# Patient Record
Sex: MALE | Race: WHITE | NOT HISPANIC OR LATINO
[De-identification: names, ages, dates, MRNs, and addresses within clinical notes are randomized per-mention and may not be internally consistent; named-entity substitution may affect disease eponyms.]

---

## 2019-12-29 ENCOUNTER — TRANSCRIPTION ENCOUNTER (OUTPATIENT)
Age: 69
End: 2019-12-29

## 2020-07-29 ENCOUNTER — TRANSCRIPTION ENCOUNTER (OUTPATIENT)
Age: 70
End: 2020-07-29

## 2021-03-10 ENCOUNTER — TRANSCRIPTION ENCOUNTER (OUTPATIENT)
Age: 71
End: 2021-03-10

## 2021-03-17 ENCOUNTER — TRANSCRIPTION ENCOUNTER (OUTPATIENT)
Age: 71
End: 2021-03-17

## 2021-03-19 ENCOUNTER — TRANSCRIPTION ENCOUNTER (OUTPATIENT)
Age: 71
End: 2021-03-19

## 2023-07-17 ENCOUNTER — FORM ENCOUNTER (OUTPATIENT)
Age: 73
End: 2023-07-17

## 2023-07-18 ENCOUNTER — APPOINTMENT (OUTPATIENT)
Dept: ORTHOPEDIC SURGERY | Facility: CLINIC | Age: 73
End: 2023-07-18
Payer: MEDICARE

## 2023-07-18 ENCOUNTER — APPOINTMENT (OUTPATIENT)
Dept: MRI IMAGING | Facility: CLINIC | Age: 73
End: 2023-07-18
Payer: MEDICARE

## 2023-07-18 VITALS — BODY MASS INDEX: 40.92 KG/M2 | WEIGHT: 270 LBS | HEIGHT: 68 IN

## 2023-07-18 DIAGNOSIS — I10 ESSENTIAL (PRIMARY) HYPERTENSION: ICD-10-CM

## 2023-07-18 DIAGNOSIS — Z00.00 ENCOUNTER FOR GENERAL ADULT MEDICAL EXAMINATION W/OUT ABNORMAL FINDINGS: ICD-10-CM

## 2023-07-18 DIAGNOSIS — S46.212A STRAIN OF MUSCLE, FASCIA AND TENDON OF OTHER PARTS OF BICEPS, LEFT ARM, INITIAL ENCOUNTER: ICD-10-CM

## 2023-07-18 PROCEDURE — 73080 X-RAY EXAM OF ELBOW: CPT | Mod: LT

## 2023-07-18 PROCEDURE — 73221 MRI JOINT UPR EXTREM W/O DYE: CPT | Mod: LT

## 2023-07-18 PROCEDURE — 99213 OFFICE O/P EST LOW 20 MIN: CPT

## 2023-07-18 RX ORDER — ATORVASTATIN CALCIUM 10 MG/1
10 TABLET, FILM COATED ORAL
Refills: 0 | Status: ACTIVE | COMMUNITY

## 2023-07-18 NOTE — HISTORY OF PRESENT ILLNESS
[Sudden] : sudden [7] : 7 [0] : 0 [Dull/Aching] : dull/aching [Sharp] : sharp [Household chores] : household chores [Leisure] : leisure [Meds] : meds [Ice] : ice [de-identified] : 7/18/23: 71 yo RHD male with left elbow and forearm pain since 7/17/23. He states he felt pain and a pop when lifting a matress. He iced and took tylenol. No numbness/tingling. No prior injuries. [] : Post Surgical Visit: no [FreeTextEntry1] : left elbow [FreeTextEntry3] : 7/17/23 [FreeTextEntry5] : pt was pulling on a mattress and felt a pop in the elbow on 7/17/23. [FreeTextEntry7] : left forearm, left wrist [de-identified] : motion/pressure

## 2023-07-18 NOTE — PHYSICAL EXAM
[Left] : left elbow [NL (0)] : extension 0 degrees [Pain with Extension] : pain with extension [5___] : extension 5[unfilled]/5 [] : light touch intact [de-identified] : Equivocal findings to palpable distal biceps tendon  [TWNoteComboBox7] : flexion 130 degrees

## 2023-07-18 NOTE — ASSESSMENT
[FreeTextEntry1] : Underlying pathology reviewed and treatment options discussed. \par 07/18/2023 : xrays left elbow, 3 views,  reveal negative\par Obtain  stat MRI left elbow  R/O distal biceps rupture. \par Start PT and HEP to improve mechanics and reduce pain. \par Activity modifier as tolerated. \par OTC aleve as tolerated.\par Apply ice to affected area.\par Questions addressed.\par \par The documentation recorded by the scribe accurately reflects the service I personally performed and the decisions made by me.\par I, Jose Alfredo Palmeribvilma, attest that this documentation has been prepared under the direction and in the presence of Provider Rodrigo Dangelo MD.\par \par The patient was seen by Rodrigo Dangelo MD.\par The following radiographs were ordered and read by me during this patient's visit. I reviewed each radiograph in detail with the patient, and discussed the findings as highlighted below.\par

## 2023-07-20 ENCOUNTER — APPOINTMENT (OUTPATIENT)
Dept: ORTHOPEDIC SURGERY | Facility: CLINIC | Age: 73
End: 2023-07-20
Payer: MEDICARE

## 2023-07-20 ENCOUNTER — APPOINTMENT (OUTPATIENT)
Dept: ORTHOPEDIC SURGERY | Facility: CLINIC | Age: 73
End: 2023-07-20

## 2023-07-20 VITALS — HEIGHT: 68 IN | BODY MASS INDEX: 40.92 KG/M2 | WEIGHT: 270 LBS

## 2023-07-20 DIAGNOSIS — E78.00 PURE HYPERCHOLESTEROLEMIA, UNSPECIFIED: ICD-10-CM

## 2023-07-20 PROCEDURE — 99214 OFFICE O/P EST MOD 30 MIN: CPT

## 2023-07-20 NOTE — HISTORY OF PRESENT ILLNESS
[de-identified] : 07/20/2023 Mr. NIKKI VALENTINE, a 72 year old male, presents today for left elbow. Reports that on 07.17.23 he was trying to lift a matress and he felt a "pop" and sudden onset of pain over the left elbow. Saw Dr. Dangelo on 07.18.23 and was sent for an MRI which shows complete rupture of the distal biceps tendon. \par Pmhx [FreeTextEntry1] : LT Elbow [FreeTextEntry5] : Jie is a 72 year M, here for an evaluation of the LT elbow. Pt was referred from [facility]. 07/17/2023 was lifting a mattress and heard a "pop" noise. Here for MRI Review.

## 2023-07-20 NOTE — PHYSICAL EXAM
[Left] : left elbow [NL (150)] : flexion 150 degrees [NL (0)] : extension 0 degrees [Pain with Pronation] : pain with pronation [Pain with Supination] : pain with supination [] : light touch intact

## 2023-07-20 NOTE — DATA REVIEWED
[MRI] : MRI [Left] : left [Elbow] : elbow [Report was reviewed and noted in the chart] : The report was reviewed and noted in the chart [I independently reviewed and interpreted images and report] : I independently reviewed and interpreted images and report [I reviewed the films/CD] : I reviewed the films/CD [FreeTextEntry1] : 07.18.23\par 1. Acute appearing complete disruption of the biceps tendon insertion with 2 cm of retraction and severe edema and swelling surrounding the retracted tendon with muscle strains and edema throughout the volar aspect of the elbow consistent with acute traumatic injury.\par 2. Moderate grade lateral epicondylitis, mild grade medial epicondylitis and mild triceps tendinopathy.\par 3. No acute fracture, malalignment or loose body.\par 4. Patient motion degrades image quality on multiple imaging sequences. Cimetidine Counseling:  I discussed with the patient the risks of Cimetidine including but not limited to gynecomastia, headache, diarrhea, nausea, drowsiness, arrhythmias, pancreatitis, skin rashes, psychosis, bone marrow suppression and kidney toxicity.

## 2023-07-20 NOTE — DISCUSSION/SUMMARY
[de-identified] : 72m with rupture left distal biceps tendon. r/b/a of surgical intervention and conservative management discussed. pt given to opportunity to ask all questions and all questions were answered.   Pt would like to proceed with surgery as discussed.\par \par Will plan for left distal biceps repair\par \par The patient was advised of the diagnosis. The natural history of the pathology was explained in full to the patient in layman's terms. All questions were answered. The risks and benefits of surgical and non-surgical treatment alternatives were explained in full to the patient. \par The patient demonstrated a full understanding of the surgical and non-surgical options. The risks of surgery were outlined in full to the patient including but not limited to bleeding, scarring, infection, sepsis, neurologic injury, vascular injury, failure to resolve symptoms, symptom recurrence, the need for further surgery, non-healing, wound breakdown, deep vein thrombosis, pulmonary embolism, spontaneous osteonecrosis, anesthesia complications and even death. The patient understood all the risks and accepted them and understood that other complications could occur that are not mentioned above. The intraoperative plan, post-operative plan, post-operative expectations and limitations were explained in full. Expectations from non-surgical treatment were explained in full as well. The patient demonstrated a complete understanding of the treatment alternatives and requested the above-mentioned procedure. This will be scheduled accordingly\par  \par The advantages, disadvantages, complications and alternatives of continued non-operative treatment versus operative treatment were discussed with the patient. We specifically discussed the risks of bleeding, infection, damage to neurovascular structures, failure of wound healing, wound dehiscence, scaring, failure of repair, need for revision surgery, elbow pain, elbow stiffness, elbow arthritis and complications of surgery and anesthesia in general including deep venous thrombosis, pulmonary embolism, myocardial infarction, stroke, loss of limb and death. The patient understood and agreed to proceed.\par  \par Entered by Ilsa Thrasher acting as scribe.\par Dr. Lambert-\par The documentation recorded by the scribe accurately reflects the service I personally performed and the decisions made by me.

## 2023-07-28 ENCOUNTER — APPOINTMENT (OUTPATIENT)
Dept: ORTHOPEDIC SURGERY | Facility: AMBULATORY SURGERY CENTER | Age: 73
End: 2023-07-28
Payer: MEDICARE

## 2023-07-28 PROCEDURE — 24342 REPAIR OF RUPTURED TENDON: CPT | Mod: AS,LT

## 2023-07-28 PROCEDURE — 24342 REPAIR OF RUPTURED TENDON: CPT | Mod: LT

## 2023-07-28 RX ORDER — OXYCODONE 5 MG/1
5 TABLET ORAL
Qty: 42 | Refills: 0 | Status: ACTIVE | COMMUNITY
Start: 2023-07-28 | End: 1900-01-01

## 2023-08-08 ENCOUNTER — APPOINTMENT (OUTPATIENT)
Dept: ORTHOPEDIC SURGERY | Facility: CLINIC | Age: 73
End: 2023-08-08
Payer: MEDICARE

## 2023-08-08 VITALS — WEIGHT: 270 LBS | HEIGHT: 68 IN | BODY MASS INDEX: 40.92 KG/M2

## 2023-08-08 PROCEDURE — 99024 POSTOP FOLLOW-UP VISIT: CPT

## 2023-08-08 NOTE — HISTORY OF PRESENT ILLNESS
[de-identified] : dos: 07.28.23 08/08/2023: Pt here for pov1 s/p left distal biceps repair. Doing well. Denies f/c/s.  07/20/2023 Mr. NIKKI VALENTINE, a 72 year old male, presents today for left elbow. Reports that on 07.17.23 he was trying to lift a matress and he felt a "pop" and sudden onset of pain over the left elbow. Saw Dr. Dangelo on 07.18.23 and was sent for an MRI which shows complete rupture of the distal biceps tendon.  Pmhx [FreeTextEntry1] : Lt Bicep [FreeTextEntry5] : Pt here for PO 1 Lt Bicep. Sometimes feels a shooting pain

## 2023-08-08 NOTE — DISCUSSION/SUMMARY
[de-identified] : 72m s/p left distal biceps repair 07.28.23 1) start physical therapy - protocol provided 2) reviewed post-op precautions and procedures. 3) cryotherapy, rest and activity modification 4) d/c sling 5) rtc 4 weeks  Entered by Ilsa Thrasher acting as scribe. Dr. Lambert- The documentation recorded by the scribe accurately reflects the service I personally performed and the decisions made by me.

## 2023-09-05 ENCOUNTER — APPOINTMENT (OUTPATIENT)
Dept: ORTHOPEDIC SURGERY | Facility: CLINIC | Age: 73
End: 2023-09-05
Payer: MEDICARE

## 2023-09-05 VITALS — BODY MASS INDEX: 40.92 KG/M2 | WEIGHT: 270 LBS | HEIGHT: 68 IN

## 2023-09-05 PROCEDURE — 99024 POSTOP FOLLOW-UP VISIT: CPT

## 2023-09-05 NOTE — HISTORY OF PRESENT ILLNESS
[1] : 2 [0] : 0 [de-identified] : dos: 07.28.23 9/5/23: Here for pov2 s/p left distal biceps repair. Attending PT. Doing well.  08/08/2023: Pt here for pov1 s/p left distal biceps repair. Doing well. Denies f/c/s.  07/20/2023 Mr. NIKKI VALENTINE, a 72 year old male, presents today for left elbow. Reports that on 07.17.23 he was trying to lift a matress and he felt a "pop" and sudden onset of pain over the left elbow. Saw Dr. Dangelo on 07.18.23 and was sent for an MRI which shows complete rupture of the distal biceps tendon.  Pmhx [FreeTextEntry1] : Lt Bicep [FreeTextEntry5] : Pt here for PO 1 Lt Bicep. Pt pain Improving. Slight stiffness. Pt feeling an occasional pain in the Lt wrist. Pt compliant with physical therapy goes twice a week feels like it's helping.  [de-identified] : PT

## 2023-09-05 NOTE — DISCUSSION/SUMMARY
[de-identified] : 72m s/p left distal biceps repair 07.28.23 1) c/w physical therapy - protocol provided 2) reviewed post-op precautions and procedures. 3) cryotherapy, rest and activity modification 4) rtc 6 weeks  Entered by Ilsa Thrasher acting as scribe. Dr. Lambert- The documentation recorded by the scribe accurately reflects the service I personally performed and the decisions made by me.

## 2023-09-05 NOTE — PHYSICAL EXAM
[] : palpable distal biceps [NL (150)] : flexion 150 degrees [NL (0)] : extension 0 degrees [NL (90)] : supination 90 degrees

## 2023-10-17 ENCOUNTER — APPOINTMENT (OUTPATIENT)
Dept: ORTHOPEDIC SURGERY | Facility: CLINIC | Age: 73
End: 2023-10-17
Payer: MEDICARE

## 2023-10-17 VITALS — HEIGHT: 68 IN | WEIGHT: 270 LBS | BODY MASS INDEX: 40.92 KG/M2

## 2023-10-17 DIAGNOSIS — S46.212A STRAIN OF MUSCLE, FASCIA AND TENDON OF OTHER PARTS OF BICEPS, LEFT ARM, INITIAL ENCOUNTER: ICD-10-CM

## 2023-10-17 PROCEDURE — 99024 POSTOP FOLLOW-UP VISIT: CPT

## 2024-03-26 ENCOUNTER — NON-APPOINTMENT (OUTPATIENT)
Age: 74
End: 2024-03-26